# Patient Record
Sex: MALE | Race: WHITE | URBAN - METROPOLITAN AREA
[De-identification: names, ages, dates, MRNs, and addresses within clinical notes are randomized per-mention and may not be internally consistent; named-entity substitution may affect disease eponyms.]

---

## 2021-09-27 ENCOUNTER — OFFICE VISIT (OUTPATIENT)
Dept: URBAN - METROPOLITAN AREA CLINIC 48 | Facility: CLINIC | Age: 81
End: 2021-09-27
Payer: MEDICARE

## 2021-09-27 DIAGNOSIS — H04.123 DRY EYE SYNDROME OF BILATERAL LACRIMAL GLANDS: ICD-10-CM

## 2021-09-27 PROCEDURE — 92134 CPTRZ OPH DX IMG PST SGM RTA: CPT | Performed by: STUDENT IN AN ORGANIZED HEALTH CARE EDUCATION/TRAINING PROGRAM

## 2021-09-27 PROCEDURE — 92004 COMPRE OPH EXAM NEW PT 1/>: CPT | Performed by: STUDENT IN AN ORGANIZED HEALTH CARE EDUCATION/TRAINING PROGRAM

## 2021-09-27 ASSESSMENT — INTRAOCULAR PRESSURE
OD: 14
OS: 12

## 2021-09-27 ASSESSMENT — VISUAL ACUITY: OD: 20/30

## 2021-09-27 ASSESSMENT — KERATOMETRY
OS: 44.38
OD: 44.00

## 2021-09-27 NOTE — IMPRESSION/PLAN
Impression: Combined forms of age-related cataract, bilateral: H25.813. Plan: The patient has a visually significant cataract in the right and left eye. After discussion with the patient and careful examination it has been determined that a cataract in the right and left eye is accounting for a significant amount of patient's visual symptoms. Cataract surgery and the associated risks, benefits, alternatives, expectations, and recovery were discussed in detail with the patient. All questions were answered. The patient understands that there may be some limitation in visual potential given any pre-existing ocular disease. Advised patient we will be usin [facility] [place] for Phenylephrine Schedule Cataract Surgery OD then OS. RL 2
good dilation, no previous LASIK surgery, reports current alpha blockers (hx of glaucoma) Discussed lens options with patient, patient candidate for standard vs toric lens.

## 2021-09-27 NOTE — IMPRESSION/PLAN
Impression: Dry eye syndrome of bilateral lacrimal glands: H04.123. Allergy component Plan: Explained the nature of the condition and need for increase lubrication with artificial tears. OU Continue current AFT Refresh up to QID Start  lubricating OTC gel For itching continue current allergy gtts.  
- Advised pt. to not used Latanoprost for itching relief

## 2021-10-05 ENCOUNTER — TESTING ONLY (OUTPATIENT)
Dept: URBAN - METROPOLITAN AREA CLINIC 48 | Facility: CLINIC | Age: 81
End: 2021-10-05
Payer: MEDICARE

## 2021-10-05 DIAGNOSIS — H25.813 COMBINED FORMS OF AGE-RELATED CATARACT, BILATERAL: Primary | ICD-10-CM

## 2021-10-05 ASSESSMENT — PACHYMETRY
OS: 2.53
OD: 22.82
OS: 22.72
OD: 2.61

## 2021-10-05 ASSESSMENT — KERATOMETRY
OS: 44.24
OD: 44.07

## 2021-10-14 ENCOUNTER — SURGERY (OUTPATIENT)
Dept: URBAN - METROPOLITAN AREA SURGERY 26 | Facility: SURGERY | Age: 81
End: 2021-10-14
Payer: MEDICARE

## 2021-10-14 PROCEDURE — 66982 XCAPSL CTRC RMVL CPLX WO ECP: CPT | Performed by: STUDENT IN AN ORGANIZED HEALTH CARE EDUCATION/TRAINING PROGRAM

## 2021-10-15 ENCOUNTER — POST-OPERATIVE VISIT (OUTPATIENT)
Dept: URBAN - METROPOLITAN AREA CLINIC 48 | Facility: CLINIC | Age: 81
End: 2021-10-15
Payer: MEDICARE

## 2021-10-15 DIAGNOSIS — Z48.810 ENCOUNTER FOR SURGICAL AFTERCARE FOLLOWING SURGERY ON A SENSE ORGAN: Primary | ICD-10-CM

## 2021-10-15 PROCEDURE — 99024 POSTOP FOLLOW-UP VISIT: CPT | Performed by: OPHTHALMOLOGY

## 2021-10-15 ASSESSMENT — INTRAOCULAR PRESSURE: OD: 24

## 2021-10-15 NOTE — IMPRESSION/PLAN
Impression: S/P Complex Cataract Extraction by phacoemulsification with IOL placement [id number] OD - 1 Day. Encounter for surgical aftercare following surgery on a sense organ  Z48.910.  Plan: due to elevated IOP start Brimonidine BID OD

## 2021-10-20 ENCOUNTER — POST-OPERATIVE VISIT (OUTPATIENT)
Dept: URBAN - METROPOLITAN AREA CLINIC 48 | Facility: CLINIC | Age: 81
End: 2021-10-20
Payer: MEDICARE

## 2021-10-20 PROCEDURE — 99024 POSTOP FOLLOW-UP VISIT: CPT | Performed by: OPHTHALMOLOGY

## 2021-10-20 ASSESSMENT — INTRAOCULAR PRESSURE: OD: 19

## 2021-10-20 NOTE — IMPRESSION/PLAN
Impression: S/P Complex Cataract Extraction by phacoemulsification with IOL placement [id number] OD - 6 Days. Encounter for surgical aftercare following surgery on a sense organ  Z48.810.  Post operative instructions reviewed - Condition is improving - Plan: keep surgery for the 28th

## 2021-10-28 ENCOUNTER — SURGERY (OUTPATIENT)
Dept: URBAN - METROPOLITAN AREA SURGERY 26 | Facility: SURGERY | Age: 81
End: 2021-10-28
Payer: MEDICARE

## 2021-10-28 DIAGNOSIS — H25.812 COMBINED FORMS OF AGE-RELATED CATARACT, LEFT EYE: Primary | ICD-10-CM

## 2021-10-28 DIAGNOSIS — H21.81 FLOPPY IRIS SYNDROME: ICD-10-CM

## 2021-10-28 PROCEDURE — 66982 XCAPSL CTRC RMVL CPLX WO ECP: CPT | Performed by: STUDENT IN AN ORGANIZED HEALTH CARE EDUCATION/TRAINING PROGRAM

## 2021-10-29 ENCOUNTER — POST-OPERATIVE VISIT (OUTPATIENT)
Dept: URBAN - METROPOLITAN AREA CLINIC 48 | Facility: CLINIC | Age: 81
End: 2021-10-29
Payer: MEDICARE

## 2021-10-29 PROCEDURE — 99024 POSTOP FOLLOW-UP VISIT: CPT | Performed by: OPHTHALMOLOGY

## 2021-10-29 ASSESSMENT — INTRAOCULAR PRESSURE
OD: 16
OS: 18

## 2021-10-29 NOTE — IMPRESSION/PLAN
Impression: S/P Complex Cataract Extraction by phacoemulsification with IOL placement; ([id number]) Phenylephrine HCL/ Lidocaine HCL OS - 1 Day. Presence of intraocular lens  Z96.1.  Post operative instructions reviewed - Condition is improving - Plan: RTC  1 week PO 2

## 2021-11-04 ENCOUNTER — POST-OPERATIVE VISIT (OUTPATIENT)
Dept: URBAN - METROPOLITAN AREA CLINIC 48 | Facility: CLINIC | Age: 81
End: 2021-11-04
Payer: MEDICARE

## 2021-11-04 DIAGNOSIS — Z96.1 PRESENCE OF INTRAOCULAR LENS: Primary | ICD-10-CM

## 2021-11-04 PROCEDURE — 99024 POSTOP FOLLOW-UP VISIT: CPT | Performed by: STUDENT IN AN ORGANIZED HEALTH CARE EDUCATION/TRAINING PROGRAM

## 2021-11-04 ASSESSMENT — INTRAOCULAR PRESSURE
OD: 20
OS: 19

## 2021-11-04 NOTE — IMPRESSION/PLAN
Impression: S/P Complex Cataract Extraction by phacoemulsification with IOL placement; ([id number]) Phenylephrine HCL/ Lidocaine HCL OS - 7 Days. Presence of intraocular lens  Z96.1. Post operative instructions reviewed - Condition is improving - Patient to continue Latanoprost qhs OU Plan: RTC 3-4 weeks PO 3

## 2021-12-06 ENCOUNTER — POST-OPERATIVE VISIT (OUTPATIENT)
Dept: URBAN - METROPOLITAN AREA CLINIC 48 | Facility: CLINIC | Age: 81
End: 2021-12-06
Payer: MEDICARE

## 2021-12-06 PROCEDURE — 99024 POSTOP FOLLOW-UP VISIT: CPT | Performed by: STUDENT IN AN ORGANIZED HEALTH CARE EDUCATION/TRAINING PROGRAM

## 2021-12-06 ASSESSMENT — INTRAOCULAR PRESSURE
OD: 13
OS: 13

## 2021-12-06 NOTE — IMPRESSION/PLAN
Impression: S/P Complex Cataract Extraction by phacoemulsification with IOL placement; ([id number]) Phenylephrine HCL/ Lidocaine HCL OS - 39 Days. Presence of intraocular lens  Z96.1. Plan: OU Continue Latanoprost QHS Continue lubricating eyes w/current Refresh gtts.  up to QHS

- IOL in good position
- no RD/RT symptoms endorsed by patient
- RTC precautions with patient
- RTC 6 months for repeat DFE with [facility], will consider d/c latanoprost after reviewing test.